# Patient Record
Sex: FEMALE | Race: WHITE | NOT HISPANIC OR LATINO | URBAN - METROPOLITAN AREA
[De-identification: names, ages, dates, MRNs, and addresses within clinical notes are randomized per-mention and may not be internally consistent; named-entity substitution may affect disease eponyms.]

---

## 2022-06-01 ENCOUNTER — APPOINTMENT (RX ONLY)
Dept: URBAN - METROPOLITAN AREA CLINIC 75 | Facility: CLINIC | Age: 27
Setting detail: DERMATOLOGY
End: 2022-06-01

## 2022-06-01 DIAGNOSIS — L91.0 HYPERTROPHIC SCAR: ICD-10-CM

## 2022-06-01 PROCEDURE — ? COUNSELING

## 2022-06-01 PROCEDURE — ? FULL BODY SKIN EXAM - DECLINED

## 2022-06-01 PROCEDURE — 99202 OFFICE O/P NEW SF 15 MIN: CPT

## 2022-06-01 PROCEDURE — ? DEFER

## 2022-06-01 PROCEDURE — ? ADDITIONAL NOTES

## 2022-06-01 NOTE — PROCEDURE: ADDITIONAL NOTES
Additional Notes: Patient consent was obtained to proceed with the visit and recommended plan of care after discussion of all risks and benefits, including the risks of COVID-19 exposure.
Detail Level: Simple
Render Risk Assessment In Note?: yes
Render Risk Assessment In Note?: no
Additional Notes: I recommended she wait until after pregnancy to have this electively removed. As I will not be here, Consulted with Dr López, she advised that she does not excise keloids would refer to plastics. Will send referral to plastics, Dr Roosevelt Shah.

## 2022-06-01 NOTE — PROCEDURE: DEFER
Instructions (Optional): Patient is currently 6 months pregnant and suggested to wait until after she deliver her baby for the cosmetic surgery. The surgical procedure was explained in details to patient the different options.\\nPatient was also informed that even though the keloid can be removed, there is still a chance that the keloid could return and patient stated that she understand all risks and options of procedure
Detail Level: Detailed
Introduction Text (Please End With A Colon): The following procedure was deferred: